# Patient Record
Sex: FEMALE | Race: WHITE | NOT HISPANIC OR LATINO | URBAN - METROPOLITAN AREA
[De-identification: names, ages, dates, MRNs, and addresses within clinical notes are randomized per-mention and may not be internally consistent; named-entity substitution may affect disease eponyms.]

---

## 2018-05-23 ENCOUNTER — OUTPATIENT (OUTPATIENT)
Dept: OUTPATIENT SERVICES | Facility: HOSPITAL | Age: 48
LOS: 1 days | End: 2018-05-23
Payer: MEDICAID

## 2018-05-23 VITALS
SYSTOLIC BLOOD PRESSURE: 141 MMHG | WEIGHT: 169.98 LBS | HEART RATE: 57 BPM | DIASTOLIC BLOOD PRESSURE: 86 MMHG | HEIGHT: 61 IN | OXYGEN SATURATION: 98 % | RESPIRATION RATE: 18 BRPM | TEMPERATURE: 98 F

## 2018-05-23 DIAGNOSIS — Z98.890 OTHER SPECIFIED POSTPROCEDURAL STATES: Chronic | ICD-10-CM

## 2018-05-23 DIAGNOSIS — R94.39 ABNORMAL RESULT OF OTHER CARDIOVASCULAR FUNCTION STUDY: ICD-10-CM

## 2018-05-23 DIAGNOSIS — Z90.49 ACQUIRED ABSENCE OF OTHER SPECIFIED PARTS OF DIGESTIVE TRACT: Chronic | ICD-10-CM

## 2018-05-23 LAB
ALBUMIN SERPL ELPH-MCNC: 4.1 G/DL — SIGNIFICANT CHANGE UP (ref 3.3–5)
ALP SERPL-CCNC: 47 U/L — SIGNIFICANT CHANGE UP (ref 40–120)
ALT FLD-CCNC: 15 U/L — SIGNIFICANT CHANGE UP (ref 10–45)
ANION GAP SERPL CALC-SCNC: 10 MMOL/L — SIGNIFICANT CHANGE UP (ref 5–17)
AST SERPL-CCNC: 18 U/L — SIGNIFICANT CHANGE UP (ref 10–40)
BILIRUB SERPL-MCNC: 0.4 MG/DL — SIGNIFICANT CHANGE UP (ref 0.2–1.2)
BUN SERPL-MCNC: 14 MG/DL — SIGNIFICANT CHANGE UP (ref 7–23)
CALCIUM SERPL-MCNC: 9.2 MG/DL — SIGNIFICANT CHANGE UP (ref 8.4–10.5)
CHLORIDE SERPL-SCNC: 102 MMOL/L — SIGNIFICANT CHANGE UP (ref 96–108)
CO2 SERPL-SCNC: 29 MMOL/L — SIGNIFICANT CHANGE UP (ref 22–31)
CREAT SERPL-MCNC: 0.6 MG/DL — SIGNIFICANT CHANGE UP (ref 0.5–1.3)
GLUCOSE SERPL-MCNC: 84 MG/DL — SIGNIFICANT CHANGE UP (ref 70–99)
HCG SERPL QL: NEGATIVE — SIGNIFICANT CHANGE UP
HCT VFR BLD CALC: 38.7 % — SIGNIFICANT CHANGE UP (ref 34.5–45)
HGB BLD-MCNC: 13.1 G/DL — SIGNIFICANT CHANGE UP (ref 11.5–15.5)
MCHC RBC-ENTMCNC: 30.9 PG — SIGNIFICANT CHANGE UP (ref 27–34)
MCHC RBC-ENTMCNC: 33.7 GM/DL — SIGNIFICANT CHANGE UP (ref 32–36)
MCV RBC AUTO: 91.7 FL — SIGNIFICANT CHANGE UP (ref 80–100)
PLATELET # BLD AUTO: 238 K/UL — SIGNIFICANT CHANGE UP (ref 150–400)
POTASSIUM SERPL-MCNC: 3.3 MMOL/L — LOW (ref 3.5–5.3)
POTASSIUM SERPL-SCNC: 3.3 MMOL/L — LOW (ref 3.5–5.3)
PROT SERPL-MCNC: 6.9 G/DL — SIGNIFICANT CHANGE UP (ref 6–8.3)
RBC # BLD: 4.22 M/UL — SIGNIFICANT CHANGE UP (ref 3.8–5.2)
RBC # FLD: 11.3 % — SIGNIFICANT CHANGE UP (ref 10.3–14.5)
SODIUM SERPL-SCNC: 141 MMOL/L — SIGNIFICANT CHANGE UP (ref 135–145)
WBC # BLD: 5.1 K/UL — SIGNIFICANT CHANGE UP (ref 3.8–10.5)
WBC # FLD AUTO: 5.1 K/UL — SIGNIFICANT CHANGE UP (ref 3.8–10.5)

## 2018-05-23 PROCEDURE — C1769: CPT

## 2018-05-23 PROCEDURE — 93005 ELECTROCARDIOGRAM TRACING: CPT

## 2018-05-23 PROCEDURE — 93458 L HRT ARTERY/VENTRICLE ANGIO: CPT

## 2018-05-23 PROCEDURE — C1887: CPT

## 2018-05-23 PROCEDURE — 80053 COMPREHEN METABOLIC PANEL: CPT

## 2018-05-23 PROCEDURE — 93458 L HRT ARTERY/VENTRICLE ANGIO: CPT | Mod: 26

## 2018-05-23 PROCEDURE — 85027 COMPLETE CBC AUTOMATED: CPT

## 2018-05-23 PROCEDURE — 99152 MOD SED SAME PHYS/QHP 5/>YRS: CPT

## 2018-05-23 PROCEDURE — 93010 ELECTROCARDIOGRAM REPORT: CPT

## 2018-05-23 PROCEDURE — C1894: CPT

## 2018-05-23 PROCEDURE — 84703 CHORIONIC GONADOTROPIN ASSAY: CPT

## 2018-05-23 RX ORDER — ATENOLOL 25 MG/1
0 TABLET ORAL
Qty: 0 | Refills: 0 | COMMUNITY

## 2018-05-23 RX ORDER — ATENOLOL AND CHLORTHALIDONE 50; 25 MG/1; MG/1
1 TABLET ORAL
Qty: 0 | Refills: 0 | COMMUNITY

## 2018-05-23 RX ORDER — ASPIRIN/CALCIUM CARB/MAGNESIUM 324 MG
1 TABLET ORAL
Qty: 0 | Refills: 0 | COMMUNITY

## 2018-05-23 RX ORDER — AMLODIPINE BESYLATE 2.5 MG/1
1 TABLET ORAL
Qty: 0 | Refills: 0 | COMMUNITY

## 2018-05-23 RX ORDER — POTASSIUM CHLORIDE 20 MEQ
25 PACKET (EA) ORAL
Qty: 0 | Refills: 0 | Status: COMPLETED | OUTPATIENT
Start: 2018-05-23 | End: 2018-05-23

## 2018-05-23 RX ADMIN — Medication 25 MILLIEQUIVALENT(S): at 12:40

## 2018-05-23 RX ADMIN — Medication 25 MILLIEQUIVALENT(S): at 12:39

## 2018-05-23 NOTE — H&P CARDIOLOGY - FAMILY HISTORY
Father  Still living? Unknown  HLD (hyperlipidemia), Age at diagnosis: Age Unknown  Diabetes mellitus, Age at diagnosis: Age Unknown     Mother  Still living? Unknown  HLD (hyperlipidemia), Age at diagnosis: Age Unknown  Diabetes mellitus, Age at diagnosis: Age Unknown

## 2018-05-23 NOTE — H&P CARDIOLOGY - HISTORY OF PRESENT ILLNESS
This is a 46 yo Khmer female LMP 5/10/18 PMH obesity s/p gastric sleeve with 100 pound weight loss, HTN, HLD, right LE vein stripping, cardiac cath approx 5 years ago pt. told it was normal, who presented to cardiology Dr. Villagomez with complaints of progressing SOB, sharp, needle like chest pain lasting a few seconds to minutes relieved with rest. Pt. denies dizziness, diaphoresis, palpitations, nausea, vomiting, peripheral edema, recent weight gain, or syncope. NST 4/20/18 small apical partially reversible defect, EF 70%.  Echocardiogram 11/2017 EF 55%; mild AR, mild MR, mild TR.  Pt. presents today asymptomatic for further evaluation and cardiac cath.     Antianginal regimen:  Amlodipine, Atenolol

## 2018-09-12 ENCOUNTER — EMERGENCY (EMERGENCY)
Facility: HOSPITAL | Age: 48
LOS: 1 days | Discharge: HOME | End: 2018-09-12
Attending: EMERGENCY MEDICINE | Admitting: EMERGENCY MEDICINE
Payer: MEDICAID

## 2018-09-12 VITALS
TEMPERATURE: 98 F | HEART RATE: 64 BPM | DIASTOLIC BLOOD PRESSURE: 77 MMHG | OXYGEN SATURATION: 100 % | RESPIRATION RATE: 18 BRPM | SYSTOLIC BLOOD PRESSURE: 124 MMHG

## 2018-09-12 VITALS
OXYGEN SATURATION: 98 % | HEART RATE: 59 BPM | DIASTOLIC BLOOD PRESSURE: 94 MMHG | RESPIRATION RATE: 18 BRPM | TEMPERATURE: 98 F | SYSTOLIC BLOOD PRESSURE: 126 MMHG

## 2018-09-12 DIAGNOSIS — Z98.890 OTHER SPECIFIED POSTPROCEDURAL STATES: Chronic | ICD-10-CM

## 2018-09-12 DIAGNOSIS — Z90.49 ACQUIRED ABSENCE OF OTHER SPECIFIED PARTS OF DIGESTIVE TRACT: Chronic | ICD-10-CM

## 2018-09-12 PROBLEM — E78.5 HYPERLIPIDEMIA, UNSPECIFIED: Chronic | Status: ACTIVE | Noted: 2018-05-23

## 2018-09-12 LAB
ALBUMIN SERPL ELPH-MCNC: 4.1 G/DL — SIGNIFICANT CHANGE UP (ref 3.3–5)
ALP SERPL-CCNC: 45 U/L — SIGNIFICANT CHANGE UP (ref 40–120)
ALT FLD-CCNC: 13 U/L — SIGNIFICANT CHANGE UP (ref 4–33)
APPEARANCE UR: CLEAR — SIGNIFICANT CHANGE UP
APTT BLD: 29.4 SEC — SIGNIFICANT CHANGE UP (ref 27.5–37.4)
AST SERPL-CCNC: 16 U/L — SIGNIFICANT CHANGE UP (ref 4–32)
BASOPHILS # BLD AUTO: 0.02 K/UL — SIGNIFICANT CHANGE UP (ref 0–0.2)
BASOPHILS NFR BLD AUTO: 0.4 % — SIGNIFICANT CHANGE UP (ref 0–2)
BILIRUB SERPL-MCNC: 0.4 MG/DL — SIGNIFICANT CHANGE UP (ref 0.2–1.2)
BILIRUB UR-MCNC: NEGATIVE — SIGNIFICANT CHANGE UP
BLD GP AB SCN SERPL QL: NEGATIVE — SIGNIFICANT CHANGE UP
BLOOD UR QL VISUAL: NEGATIVE — SIGNIFICANT CHANGE UP
BUN SERPL-MCNC: 14 MG/DL — SIGNIFICANT CHANGE UP (ref 7–23)
CALCIUM SERPL-MCNC: 8.9 MG/DL — SIGNIFICANT CHANGE UP (ref 8.4–10.5)
CHLORIDE SERPL-SCNC: 103 MMOL/L — SIGNIFICANT CHANGE UP (ref 98–107)
CO2 SERPL-SCNC: 21 MMOL/L — LOW (ref 22–31)
COLOR SPEC: COLORLESS — SIGNIFICANT CHANGE UP
CREAT SERPL-MCNC: 0.65 MG/DL — SIGNIFICANT CHANGE UP (ref 0.5–1.3)
EOSINOPHIL # BLD AUTO: 0.07 K/UL — SIGNIFICANT CHANGE UP (ref 0–0.5)
EOSINOPHIL NFR BLD AUTO: 1.4 % — SIGNIFICANT CHANGE UP (ref 0–6)
GLUCOSE SERPL-MCNC: 100 MG/DL — HIGH (ref 70–99)
GLUCOSE UR-MCNC: NEGATIVE — SIGNIFICANT CHANGE UP
HCG SERPL-ACNC: < 5 MIU/ML — SIGNIFICANT CHANGE UP
HCT VFR BLD CALC: 39.1 % — SIGNIFICANT CHANGE UP (ref 34.5–45)
HGB BLD-MCNC: 13.1 G/DL — SIGNIFICANT CHANGE UP (ref 11.5–15.5)
IMM GRANULOCYTES # BLD AUTO: 0.01 # — SIGNIFICANT CHANGE UP
IMM GRANULOCYTES NFR BLD AUTO: 0.2 % — SIGNIFICANT CHANGE UP (ref 0–1.5)
INR BLD: 0.99 — SIGNIFICANT CHANGE UP (ref 0.88–1.17)
KETONES UR-MCNC: NEGATIVE — SIGNIFICANT CHANGE UP
LEUKOCYTE ESTERASE UR-ACNC: NEGATIVE — SIGNIFICANT CHANGE UP
LIDOCAIN IGE QN: 26.6 U/L — SIGNIFICANT CHANGE UP (ref 7–60)
LYMPHOCYTES # BLD AUTO: 1.87 K/UL — SIGNIFICANT CHANGE UP (ref 1–3.3)
LYMPHOCYTES # BLD AUTO: 38.2 % — SIGNIFICANT CHANGE UP (ref 13–44)
MCHC RBC-ENTMCNC: 29.8 PG — SIGNIFICANT CHANGE UP (ref 27–34)
MCHC RBC-ENTMCNC: 33.5 % — SIGNIFICANT CHANGE UP (ref 32–36)
MCV RBC AUTO: 89.1 FL — SIGNIFICANT CHANGE UP (ref 80–100)
MONOCYTES # BLD AUTO: 0.4 K/UL — SIGNIFICANT CHANGE UP (ref 0–0.9)
MONOCYTES NFR BLD AUTO: 8.2 % — SIGNIFICANT CHANGE UP (ref 2–14)
NEUTROPHILS # BLD AUTO: 2.52 K/UL — SIGNIFICANT CHANGE UP (ref 1.8–7.4)
NEUTROPHILS NFR BLD AUTO: 51.6 % — SIGNIFICANT CHANGE UP (ref 43–77)
NITRITE UR-MCNC: NEGATIVE — SIGNIFICANT CHANGE UP
NRBC # FLD: 0 — SIGNIFICANT CHANGE UP
PH UR: 8 — SIGNIFICANT CHANGE UP (ref 5–8)
PLATELET # BLD AUTO: 243 K/UL — SIGNIFICANT CHANGE UP (ref 150–400)
PMV BLD: 10.6 FL — SIGNIFICANT CHANGE UP (ref 7–13)
POTASSIUM SERPL-MCNC: 3.5 MMOL/L — SIGNIFICANT CHANGE UP (ref 3.5–5.3)
POTASSIUM SERPL-SCNC: 3.5 MMOL/L — SIGNIFICANT CHANGE UP (ref 3.5–5.3)
PROT SERPL-MCNC: 7.3 G/DL — SIGNIFICANT CHANGE UP (ref 6–8.3)
PROT UR-MCNC: NEGATIVE — SIGNIFICANT CHANGE UP
PROTHROM AB SERPL-ACNC: 11.4 SEC — SIGNIFICANT CHANGE UP (ref 9.8–13.1)
RBC # BLD: 4.39 M/UL — SIGNIFICANT CHANGE UP (ref 3.8–5.2)
RBC # FLD: 12.4 % — SIGNIFICANT CHANGE UP (ref 10.3–14.5)
RH IG SCN BLD-IMP: POSITIVE — SIGNIFICANT CHANGE UP
SODIUM SERPL-SCNC: 139 MMOL/L — SIGNIFICANT CHANGE UP (ref 135–145)
SP GR SPEC: 1.01 — SIGNIFICANT CHANGE UP (ref 1–1.04)
UROBILINOGEN FLD QL: NORMAL — SIGNIFICANT CHANGE UP
WBC # BLD: 4.89 K/UL — SIGNIFICANT CHANGE UP (ref 3.8–10.5)
WBC # FLD AUTO: 4.89 K/UL — SIGNIFICANT CHANGE UP (ref 3.8–10.5)

## 2018-09-12 PROCEDURE — 74177 CT ABD & PELVIS W/CONTRAST: CPT | Mod: 26

## 2018-09-12 PROCEDURE — 99285 EMERGENCY DEPT VISIT HI MDM: CPT

## 2018-09-12 PROCEDURE — 71046 X-RAY EXAM CHEST 2 VIEWS: CPT | Mod: 26

## 2018-09-12 RX ORDER — SODIUM CHLORIDE 9 MG/ML
500 INJECTION INTRAMUSCULAR; INTRAVENOUS; SUBCUTANEOUS ONCE
Qty: 0 | Refills: 0 | Status: COMPLETED | OUTPATIENT
Start: 2018-09-12 | End: 2018-09-12

## 2018-09-12 RX ORDER — OXYCODONE AND ACETAMINOPHEN 5; 325 MG/1; MG/1
1 TABLET ORAL ONCE
Qty: 0 | Refills: 0 | Status: DISCONTINUED | OUTPATIENT
Start: 2018-09-12 | End: 2018-09-12

## 2018-09-12 RX ORDER — KETOROLAC TROMETHAMINE 30 MG/ML
30 SYRINGE (ML) INJECTION ONCE
Qty: 0 | Refills: 0 | Status: DISCONTINUED | OUTPATIENT
Start: 2018-09-12 | End: 2018-09-12

## 2018-09-12 RX ORDER — SODIUM CHLORIDE 9 MG/ML
1000 INJECTION INTRAMUSCULAR; INTRAVENOUS; SUBCUTANEOUS ONCE
Qty: 0 | Refills: 0 | Status: COMPLETED | OUTPATIENT
Start: 2018-09-12 | End: 2018-09-12

## 2018-09-12 RX ORDER — MORPHINE SULFATE 50 MG/1
4 CAPSULE, EXTENDED RELEASE ORAL ONCE
Qty: 0 | Refills: 0 | Status: DISCONTINUED | OUTPATIENT
Start: 2018-09-12 | End: 2018-09-12

## 2018-09-12 RX ADMIN — SODIUM CHLORIDE 1000 MILLILITER(S): 9 INJECTION INTRAMUSCULAR; INTRAVENOUS; SUBCUTANEOUS at 12:48

## 2018-09-12 RX ADMIN — OXYCODONE AND ACETAMINOPHEN 1 TABLET(S): 5; 325 TABLET ORAL at 20:02

## 2018-09-12 RX ADMIN — Medication 30 MILLIGRAM(S): at 14:30

## 2018-09-12 RX ADMIN — SODIUM CHLORIDE 500 MILLILITER(S): 9 INJECTION INTRAMUSCULAR; INTRAVENOUS; SUBCUTANEOUS at 18:39

## 2018-09-12 RX ADMIN — Medication 30 MILLIGRAM(S): at 13:40

## 2018-09-12 RX ADMIN — SODIUM CHLORIDE 1000 MILLILITER(S): 9 INJECTION INTRAMUSCULAR; INTRAVENOUS; SUBCUTANEOUS at 14:30

## 2018-09-12 NOTE — ED PROVIDER NOTE - OBJECTIVE STATEMENT
HPI: Patient is a 48 y.o female with PMHx of gastric sleeve (4 years ago), HTN, Hx appendectomy and  who presents to ED sent by PCP Dr. Delgado for abdominal pain. As per patient states she began to have epigastric abdominal pain radiating to umbilicus since last night. Pain sharp in nature, rates it 7/10. Pain exacerbated with standing up, improves at rest. Patient admits to taking naproxen yesterday with little relief. Patient saw PCP today who advised patient come to ED for further eval for umbilical hernia found on outpatient exam. Admits also to urinary discomfort. Pt denies n/v/d, constipation, fevers, chills, hematuria, neck pain, back pain, weakness, numbness or tingling, chest pain, sob, hx smoking, Fhx of CAD. Admits she has had negative Cath's x 2. HPI: Patient is a 48 y.o female with PMHx of gastric sleeve (4 years ago), HTN, Hx appendectomy and  who presents to ED sent by PCP Dr. Delgado for abdominal pain. As per patient states she began to have epigastric abdominal pain radiating to umbilicus since last night. Pain sharp in nature, rates it 7/10. Pain exacerbated with standing up, improves at rest. Patient admits to taking naproxen yesterday with little relief. Patient saw PCP today who advised patient come to ED for further eval for umbilical hernia found on outpatient exam. Admits also to urinary discomfort. Pt denies n/v/d, constipation, fevers, chills, hematuria, neck pain, back pain, weakness, numbness or tingling, chest pain, sob, hx smoking, Fhx of CAD. Admits she has had negative Cath's x 2.    Gastric sleeve~performed 4 years ago at [facility], unknown physician

## 2018-09-12 NOTE — CONSULT NOTE ADULT - ASSESSMENT
48F s/p sleeve gastrectomy presenting with abdominal pain that is positional, no current pain  -Imaging reviewed with radiology. Although impression states the appearance of small bowel may be due to a wide mesenteric defect, patient has not had surgery that would cause a mesenteric defect. She also does not have any findings concerning for SBO or ischemia.  -PO challenge  -if tolerates, d/c home  -patient prefers to follow up with her own surgeon at [facility] and has his contact information. She was instructed to follow up with him tomorrow. She was also instructed that if her pain worsens, or she develops other symptoms such as nausea/emesis or fevers, she should return to the ED immediately. She verbalizes understanding and is comfortable with discharge and following up with her surgeon tomorrow.   -d/w Dr. Santosh Lauren, PGY 5  Surgery

## 2018-09-12 NOTE — ED PROVIDER NOTE - PHYSICAL EXAMINATION
Vital signs reviewed.   CONSTITUTIONAL: Well-appearing; well-nourished; in no apparent distress. Non-toxic appearing.   HEAD: Normocephalic, atraumatic.  EYES: PERRL, EOM intact, conjunctiva and sclera WNL.  ENT: normal nose; no rhinorrhea;  NECK/LYMPH: Supple; non-tender; No midline tenderness.   CARD: Normal S1, S2; no murmurs, rubs, or gallops noted.  RESP: Normal chest excursion with respiration; breath sounds clear and equal bilaterally; no wheezes, rhonchi, or rales.  ABD/GI: soft, non-distended; +diffuse abdominal tenderness noted, mostly epigastric and umbilical, small umbilical hernia appreciated; no palpable organomegaly, no pulsatile mass. NCVAT b/l.   EXT/MS: moves all extremities; distal pulses are normal, no pedal edema. No midline tenderness noted. No crepitus or obvious deformities noted.   SKIN: Normal for age and race; warm; dry; good turgor; no apparent lesions or exudate noted.  NEURO: Awake, alert, oriented x 3, no gross deficits  PSYCH: Normal mood; appropriate affect.

## 2018-09-12 NOTE — ED ADULT TRIAGE NOTE - CHIEF COMPLAINT QUOTE
pt presents sent by PMD for epigastric pain since yesterday, r/o umbilical hernia with incarceration. denies n/v/d/fevers or chills or any additional symptoms. pt appears very uncomfortable in triage, writhing in pain PMHX: htn

## 2018-09-12 NOTE — ED PROVIDER NOTE - MEDICAL DECISION MAKING DETAILS
Patient is a 48 y.o female c/o abdominal pain x 2 days. Sent in by PCP.   DDx- includes but not limited to; gastritis, pancreatitis r/o incarcerated umbilical hernia   Plan- labs, fluids, pain control, CT A/P w/ contrast, will continue ot monitor closely. Patient is a 48 y.o female c/o abdominal pain x 2 days. Sent in by PCP.   DDx- includes but not limited to; gastritis, pancreatitis r/o incarcerated umbilical hernia   Plan- labs, fluids, pain control, CT A/P w/ contrast, will continue ot monitor closely.  Seen by surgery for mesenteric defect. Pt stable for discharge and can follow up with her Biatric surgeron at [facility] as outpatient. No need for admission. Plan to discharge.

## 2018-09-12 NOTE — CONSULT NOTE ADULT - ATTENDING COMMENTS
Clinical documentation, data and note reviewed and edited where appropriate.  D/W note writer at time of consultation.

## 2018-09-12 NOTE — ED PROVIDER NOTE - PROGRESS NOTE DETAILS
Results of labs and imaging reviewed. Results of CT A/P concerning for internal hernia showing "angulation of small bowel and sigmoid colon w/ mesenteric defect". Pt had gastric sleeve performed by Dr. James at [facility] 4 years ago. Call was placed to office and message left with RN #[phone], pending callback from BEATRIZ Pelletier from office. Will place consult to inhouse surgery at this time. Will monitor closely. Surgery was consulted by myself and MD. Pending surgery evaluation. Family updated on plan. Call was re-placed to Surgeon Dr. James office with no reply~message was left on voicemail. Will continue to monitor closely. Call also placed to [facility] Dr. James surgical department at [phone] w/ message and callback number left. Patient was seen by Surgical resident, spoke to resident who states he is waiting for callback from bariatric fellow. States though he spoke to radiology regarding CT results and states based on report and patient being non-tender, well appearing with stable labs and vitals that patient may be d/c home and follow up with patient surgeon tomorrow. However pending official callback and recommendations from bariatric fellow. Will make patient and family aware. Pt was signed out to me awaiting Surgical Evaluation. Multiple attempts were made to reach Dr. James with messages left and no call back. Surgery has seen the pt and is consulting the Bariatric fellow. Pt and family aware. Dr. Barraza: Pt was signed out to me awaiting Surgical Evaluation. Multiple attempts were made to reach Dr. James with messages left and no call back. Surgery has seen the pt and is consulting the Bariatric fellow. Pt and family aware. Dr. Barraza: Pt was evaluated again by surgery after consultation and with pt's symptoms resolved and tolerating PO advised f/u as outpt with pt's Bariatric surgeon. Pt and family expressed understanding and were given copies of results.

## 2018-09-12 NOTE — CONSULT NOTE ADULT - SUBJECTIVE AND OBJECTIVE BOX
Bariatric Surgery Consult  Consulting attending: Dr. Frost    HPI: 48F with history of sleeve gastrectomy presenting with abdominal pain. Pain began this morning prior to patient eating breakfast, and has remained constant throughout the day. She denies nausea/emesis, or worsening pain with PO. She has tolerated two meals today, most recently about half an hour ago without worsening pain. She is passing flatus and having bowel movements. Pain is worse with movement, when she turns a certain way. No current pain as she is lying down.     PAST MEDICAL HISTORY:  HLD (hyperlipidemia)  Myocardial infarction  Hypertension  Obesity    PAST SURGICAL HISTORY:  H/O vein stripping  History of appendectomy   delivery delivered  sleeve gastrectomy 2014 at [facility]      MEDICATIONS: vitamins, anti-hypertensive medications      ALLERGIES:  No Known Allergies      VITALS & I/Os:  Vital Signs Last 24 Hrs  T(C): 36.9 (12 Sep 2018 19:21), Max: 37.1 (12 Sep 2018 16:14)  T(F): 98.5 (12 Sep 2018 19:21), Max: 98.8 (12 Sep 2018 16:14)  HR: 59 (12 Sep 2018 19:21) (59 - 65)  BP: 126/94 (12 Sep 2018 19:21) (109/72 - 126/94)  BP(mean): --  RR: 18 (12 Sep 2018 19:21) (18 - 19)  SpO2: 98% (12 Sep 2018 19:21) (98% - 100%)    PHYSICAL EXAM:  General: No acute distress  Respiratory: Nonlabored  Cardiovascular: Regular rate and rhythm.   Abdominal: Soft, nondistended, nontender. No rebound or guarding. No organomegaly, no palpable mass.  Extremities: Warm    LABS:                        13.1   4.89  )-----------( 243      ( 12 Sep 2018 11:45 )             39.1     09-12    139  |  103  |  14  ----------------------------<  100<H>  3.5   |  21<L>  |  0.65    Ca    8.9      12 Sep 2018 11:45    TPro  7.3  /  Alb  4.1  /  TBili  0.4  /  DBili  x   /  AST  16  /  ALT  13  /  AlkPhos  45  -12    Lactate:    PT/INR - ( 12 Sep 2018 11:45 )   PT: 11.4 SEC;   INR: 0.99          PTT - ( 12 Sep 2018 11:45 )  PTT:29.4 SEC          Urinalysis Basic - ( 12 Sep 2018 13:44 )    Color: COLORLESS / Appearance: CLEAR / S.011 / pH: 8.0  Gluc: NEGATIVE / Ketone: NEGATIVE  / Bili: NEGATIVE / Urobili: NORMAL   Blood: NEGATIVE / Protein: NEGATIVE / Nitrite: NEGATIVE   Leuk Esterase: NEGATIVE / RBC: x / WBC x   Sq Epi: x / Non Sq Epi: x / Bacteria: x        IMAGING:  CT Abdomen and Pelvis w/ Oral Cont and w/ IV Cont (18 @ 14:27) >  IMPRESSION:     Status post sleeve gastrectomy with angulation of small bowel loops and   sigmoid colon within the left hemiabdomen. Findings may be secondary to a   wide mesenteric defect. No evidence of small bowel obstruction or bowel   wall thickening.

## 2018-09-12 NOTE — ED PROVIDER NOTE - ATTENDING CONTRIBUTION TO CARE
HPI: Patient is a 48 y.o female with PMHx of gastric sleeve (4 years ago), HTN, Hx appendectomy and  who presents to ED sent by PCP Dr. Delgado for abdominal pain. As per patient states she began to have epigastric abdominal pain radiating to umbilicus since last night. Pain sharp in nature, rates it /10. Pos flatus  pe ncat  s1s2 rrr abd tender epigastric no cce      imp abd pain--bariatric surgery    plan ct--bariatric fu with results, signed outto dr staples to fu ct results and lab results and consult surgery as necessary HPI: Patient is a 48 y.o female with PMHx of gastric sleeve (4 years ago), HTN, Hx appendectomy and  who presents to ED sent by PCP Dr. Delgado for abdominal pain. As per patient states she began to have epigastric abdominal pain radiating to umbilicus since last night. Pain sharp in nature, rates it /10. Pos flatus  pe ncat  s1s2 rrr abd tender epigastric no cce      imp abd pain--bariatric surgery    plan ct--bariatric fu with results, signed out to dr su to fu ct results and lab results and consult surgery as necessary

## 2018-09-12 NOTE — ED ADULT NURSE NOTE - NSIMPLEMENTINTERV_GEN_ALL_ED
Implemented All Universal Safety Interventions:  [place] to call system. Call bell, personal items and telephone within reach. Instruct patient to call for assistance. Room bathroom lighting operational. Non-slip footwear when patient is off stretcher. Physically safe environment: no spills, clutter or unnecessary equipment. Stretcher in lowest position, wheels locked, appropriate side rails in place.

## 2018-09-12 NOTE — ED ADULT NURSE NOTE - OBJECTIVE STATEMENT
Received pt. in room 7 alert and oriented x 4, vss. Presenting to the ER complaining of right lower abdominal pain radiating to under right breast. Pt. have a history of HTN and stated " my abdomen started hurting yesterday and the pain moved to under my right breast now". Patient have no complaints of nausea or vomiting, no complaints of chest pain, labs sent will continue to monitor.

## 2021-04-08 PROBLEM — Z00.00 ENCOUNTER FOR PREVENTIVE HEALTH EXAMINATION: Status: ACTIVE | Noted: 2021-04-08

## 2021-04-12 ENCOUNTER — APPOINTMENT (OUTPATIENT)
Dept: PULMONOLOGY | Facility: CLINIC | Age: 51
End: 2021-04-12
Payer: MEDICAID

## 2021-04-12 VITALS
DIASTOLIC BLOOD PRESSURE: 79 MMHG | BODY MASS INDEX: 41.03 KG/M2 | HEART RATE: 63 BPM | HEIGHT: 60 IN | WEIGHT: 209 LBS | OXYGEN SATURATION: 97 % | RESPIRATION RATE: 16 BRPM | SYSTOLIC BLOOD PRESSURE: 123 MMHG | TEMPERATURE: 98 F

## 2021-04-12 DIAGNOSIS — Z86.79 PERSONAL HISTORY OF OTHER DISEASES OF THE CIRCULATORY SYSTEM: ICD-10-CM

## 2021-04-12 DIAGNOSIS — Z83.3 FAMILY HISTORY OF DIABETES MELLITUS: ICD-10-CM

## 2021-04-12 DIAGNOSIS — Z82.49 FAMILY HISTORY OF ISCHEMIC HEART DISEASE AND OTHER DISEASES OF THE CIRCULATORY SYSTEM: ICD-10-CM

## 2021-04-12 DIAGNOSIS — Z78.9 OTHER SPECIFIED HEALTH STATUS: ICD-10-CM

## 2021-04-12 DIAGNOSIS — R53.83 OTHER FATIGUE: ICD-10-CM

## 2021-04-12 DIAGNOSIS — Z01.811 ENCOUNTER FOR PREPROCEDURAL RESPIRATORY EXAMINATION: ICD-10-CM

## 2021-04-12 PROCEDURE — 99072 ADDL SUPL MATRL&STAF TM PHE: CPT

## 2021-04-12 PROCEDURE — 94729 DIFFUSING CAPACITY: CPT

## 2021-04-12 PROCEDURE — 94060 EVALUATION OF WHEEZING: CPT

## 2021-04-12 PROCEDURE — 94727 GAS DIL/WSHOT DETER LNG VOL: CPT

## 2021-04-12 PROCEDURE — 99203 OFFICE O/P NEW LOW 30 MIN: CPT | Mod: 25

## 2021-04-12 RX ORDER — OFLOXACIN OTIC 3 MG/ML
0.3 SOLUTION AURICULAR (OTIC)
Qty: 10 | Refills: 0 | Status: ACTIVE | COMMUNITY
Start: 2021-04-07

## 2021-04-12 RX ORDER — ACETAMINOPHEN EXTRA STRENGTH 500 MG/1
500 TABLET ORAL
Qty: 40 | Refills: 0 | Status: COMPLETED | COMMUNITY
Start: 2020-12-28

## 2021-04-12 RX ORDER — ADHESIVE TAPE 3"X 2.3 YD
50 MCG TAPE, NON-MEDICATED TOPICAL
Qty: 30 | Refills: 0 | Status: ACTIVE | COMMUNITY
Start: 2021-02-23

## 2021-04-12 RX ORDER — NAPROXEN 500 MG/1
500 TABLET ORAL
Qty: 60 | Refills: 0 | Status: ACTIVE | COMMUNITY
Start: 2021-02-23

## 2021-04-12 RX ORDER — BISACODYL 5 MG/1
5 TABLET ORAL
Qty: 20 | Refills: 0 | Status: ACTIVE | COMMUNITY
Start: 2021-04-07

## 2021-04-12 RX ORDER — ENALAPRIL MALEATE 10 MG/1
10 TABLET ORAL
Qty: 30 | Refills: 0 | Status: ACTIVE | COMMUNITY
Start: 2021-03-24

## 2021-04-12 RX ORDER — POTASSIUM CHLORIDE 1500 MG/1
20 TABLET, EXTENDED RELEASE ORAL
Qty: 30 | Refills: 0 | Status: ACTIVE | COMMUNITY
Start: 2021-01-25

## 2021-04-12 RX ORDER — ATENOLOL AND CHLORTHALIDONE 50; 25 MG/1; MG/1
50-25 TABLET ORAL
Qty: 30 | Refills: 0 | Status: ACTIVE | COMMUNITY
Start: 2021-03-24

## 2021-04-12 RX ORDER — ASPIRIN ENTERIC COATED TABLETS 81 MG 81 MG/1
81 TABLET, DELAYED RELEASE ORAL
Qty: 30 | Refills: 0 | Status: ACTIVE | COMMUNITY
Start: 2021-03-29

## 2021-05-10 NOTE — DISCUSSION/SUMMARY
[FreeTextEntry1] : 49 yo female with stable preop pulmonary exam. Home sleep study will be performed. At present there is no pulmonary contraindication for planned anesthesia and surgery. She is to follow up with her PMD as before.

## 2021-05-10 NOTE — HISTORY OF PRESENT ILLNESS
[Never] : never [Awakes Unrefreshed] : awakes unrefreshed [Fatigue] : fatigue [Hypersomnolence] : hypersomnolence [TextBox_4] : 49 yo female presents for preop pulmonary evaluation prior to bariatric surgery. The patient denies prior pulmonary problems. She complains of PRN fatigue with daytime somnolence. She had lost 100 pounds after initial bariatric surgery but has regained 40 pounds again.

## 2021-05-10 NOTE — CONSULT LETTER
[Dear  ___] : Dear  [unfilled], [Courtesy Letter:] : I had the pleasure of seeing your patient, [unfilled], in my office today. [Please see my note below.] : Please see my note below. [Consult Closing:] : Thank you very much for allowing me to participate in the care of this patient.  If you have any questions, please do not hesitate to contact me. [Sincerely,] : Sincerely, [DrUsha  ___] : Dr. SILVA

## 2021-05-10 NOTE — ADDENDUM
[FreeTextEntry1] : May 10, 2021 Spoke with patient re: PSG results; Auto PAP 4-14 cm ordered\par At present there is no pulmonary contraindication for planned anesthesia and surgery

## 2023-05-31 ENCOUNTER — EMERGENCY (EMERGENCY)
Facility: HOSPITAL | Age: 53
LOS: 1 days | Discharge: HOME | End: 2023-05-31
Attending: EMERGENCY MEDICINE
Payer: MEDICAID

## 2023-05-31 VITALS
SYSTOLIC BLOOD PRESSURE: 125 MMHG | OXYGEN SATURATION: 97 % | DIASTOLIC BLOOD PRESSURE: 85 MMHG | WEIGHT: 125 LBS | RESPIRATION RATE: 20 BRPM | HEIGHT: 70 IN | HEART RATE: 82 BPM | TEMPERATURE: 98 F

## 2023-05-31 DIAGNOSIS — Z98.890 OTHER SPECIFIED POSTPROCEDURAL STATES: Chronic | ICD-10-CM

## 2023-05-31 DIAGNOSIS — Z90.49 ACQUIRED ABSENCE OF OTHER SPECIFIED PARTS OF DIGESTIVE TRACT: Chronic | ICD-10-CM

## 2023-05-31 LAB
ALBUMIN SERPL ELPH-MCNC: 3.5 G/DL — SIGNIFICANT CHANGE UP (ref 3.3–5)
ALP SERPL-CCNC: 77 U/L — SIGNIFICANT CHANGE UP (ref 40–120)
ALT FLD-CCNC: 20 U/L — SIGNIFICANT CHANGE UP (ref 10–45)
ANION GAP SERPL CALC-SCNC: 12 MMOL/L — SIGNIFICANT CHANGE UP (ref 5–17)
AST SERPL-CCNC: 23 U/L — SIGNIFICANT CHANGE UP (ref 10–40)
BILIRUB SERPL-MCNC: 0.2 MG/DL — SIGNIFICANT CHANGE UP (ref 0.2–1.2)
BUN SERPL-MCNC: 19 MG/DL — SIGNIFICANT CHANGE UP (ref 7–23)
CALCIUM SERPL-MCNC: 8.1 MG/DL — LOW (ref 8.4–10.5)
CHLORIDE SERPL-SCNC: 105 MMOL/L — SIGNIFICANT CHANGE UP (ref 96–108)
CO2 SERPL-SCNC: 20 MMOL/L — LOW (ref 22–31)
CREAT SERPL-MCNC: 0.52 MG/DL — SIGNIFICANT CHANGE UP (ref 0.5–1.3)
EGFR: 112 ML/MIN/1.73M2 — SIGNIFICANT CHANGE UP
GLUCOSE SERPL-MCNC: 121 MG/DL — HIGH (ref 70–99)
HCG SERPL-ACNC: <2 MIU/ML — SIGNIFICANT CHANGE UP
HCT VFR BLD CALC: 27.3 % — LOW (ref 34.5–45)
HGB BLD-MCNC: 8.2 G/DL — LOW (ref 11.5–15.5)
MCHC RBC-ENTMCNC: 21 PG — LOW (ref 27–34)
MCHC RBC-ENTMCNC: 30 GM/DL — LOW (ref 32–36)
MCV RBC AUTO: 70 FL — LOW (ref 80–100)
PLATELET # BLD AUTO: 393 K/UL — SIGNIFICANT CHANGE UP (ref 150–400)
POTASSIUM SERPL-MCNC: 3.2 MMOL/L — LOW (ref 3.5–5.3)
POTASSIUM SERPL-SCNC: 3.2 MMOL/L — LOW (ref 3.5–5.3)
PROT SERPL-MCNC: 6.1 G/DL — SIGNIFICANT CHANGE UP (ref 6–8.3)
RBC # BLD: 3.9 M/UL — SIGNIFICANT CHANGE UP (ref 3.8–5.2)
RBC # FLD: 19.8 % — HIGH (ref 10.3–14.5)
SODIUM SERPL-SCNC: 137 MMOL/L — SIGNIFICANT CHANGE UP (ref 135–145)
WBC # BLD: 10.02 K/UL — SIGNIFICANT CHANGE UP (ref 3.8–10.5)
WBC # FLD AUTO: 10.02 K/UL — SIGNIFICANT CHANGE UP (ref 3.8–10.5)

## 2023-05-31 PROCEDURE — 71045 X-RAY EXAM CHEST 1 VIEW: CPT | Mod: 26

## 2023-05-31 PROCEDURE — 99284 EMERGENCY DEPT VISIT MOD MDM: CPT

## 2023-05-31 RX ORDER — SODIUM CHLORIDE 9 MG/ML
1000 INJECTION INTRAMUSCULAR; INTRAVENOUS; SUBCUTANEOUS ONCE
Refills: 0 | Status: COMPLETED | OUTPATIENT
Start: 2023-05-31 | End: 2023-05-31

## 2023-05-31 RX ADMIN — SODIUM CHLORIDE 1000 MILLILITER(S): 9 INJECTION INTRAMUSCULAR; INTRAVENOUS; SUBCUTANEOUS at 23:29

## 2023-05-31 NOTE — ED PROVIDER NOTE - PROGRESS NOTE DETAILS
Dr. Grigsby Note: pt without any sxs.  Reviewed labs with pt and daughter at bedside.  Pt with episode of vomiting, then one hour later while sitting on couch had near syncope, resolved, then episode of diarrhea.  No sxs now.  Offered overnight stay for observation, echo, pt declined and will f/u outpatient for possible echo and return if worsening.  Suspect pt with vasovagal near syncope due to GI stimulus with N/V/D.  Kelle PO.  Stable for outpt.

## 2023-05-31 NOTE — ED PROVIDER NOTE - CLINICAL SUMMARY MEDICAL DECISION MAKING FREE TEXT BOX
2 episodes of syncope with associated chest discomfort.  We will do cardiac enzymes x2 and IV fluids and reexamine the patient to determine if the cause might be related to orthostatic/vasovagal or for concerned with another cause.  Asked patient has coronary history and I have the sense that we should monitor the patient more carefully and possibly keep her for an echo and further testings.

## 2023-05-31 NOTE — ED ADULT NURSE NOTE - OBJECTIVE STATEMENT
Pt 52 year old female, A/O x4. Pt came in due to syncope. PMH- HTN. As per ems, x 1 hour, pt had nausea, near syncope and chest pain. Upon assessment, pt ambulating independent and complaining of chest pain and dizziness. Skin- warm, dry, intact. Abd. soft, nontender, nondistended. Denies sob, fever, chills, vomiting, dysuria, ha, numbness/ tingling. Pt 52 year old female, A/O x4. Pt came in due to syncope. PMH- HTN. As per pt, 2 hours prior to arrival she was grilling outside when she suddenly fel faint. Pt states she "felt like I was dying". Pt daughter states they sat her down and pt complaining of n/v/d. Upon assessment, pt appears lethargic. Skin- warm, dry, intact. Abd. soft, nontender, nondistended. Denies sob, fever, chills, dysuria, ha, numbness/ tingling.

## 2023-05-31 NOTE — ED PROVIDER NOTE - OBJECTIVE STATEMENT
This is a 52-year-old with a history of "heart attack" who felt epigastric discomfort and vomited and subsequently became near syncope.  Was lowered to the ground did not fall.  She did not fully lose consciousness.  There is no associated chest pain at the time.  She sat on the couch for 20 or 30 minutes and then tried to get up and "turned green" and again became pale and almost passed out again.  There is no abdominal pain at that time or this time.  Notably now she has mild sternal chest pressure associated with that episode which is since resolved.  Is nonradiating.  There is no shortness of breath.  There is no diaphoresis.  There is no fevers or chills.  There is no URI symptoms.

## 2023-05-31 NOTE — ED PROVIDER NOTE - NSFOLLOWUPINSTRUCTIONS_ED_ALL_ED_FT
1. No signs of emergency medical condition on today's workup.  Presumptive diagnosis made, but further evaluation may be required by your primary care doctor or specialist for a definitive diagnosis.  Therefore, follow up as directed and if symptoms change/worsen or any emergency conditions, please return to the ER.   2. Presumptive diagnosis is episode of vomiting and diarrhea stimulating a possible vasovagal near syncope.  Keep up on fluids, follow up PCP and Cardiologist, consider echocardiogram.  You were offered observation and declined, but if you feel worse again or any concerns, return to ER immediately.

## 2023-05-31 NOTE — ED PROVIDER NOTE - PHYSICAL EXAMINATION
Patient is well-appearing and in no acute distress.  Dry mucous membranes and lips.  Clear lungs.  Regular rate and rhythm.  No abdominal tenderness.  Neuro exam is normal.  Legs have no edema.  Skin is normal.

## 2023-05-31 NOTE — ED PROVIDER NOTE - PATIENT PORTAL LINK FT
You can access the FollowMyHealth Patient Portal offered by [facility] by registering at the following website: [email or web address]. By joining [employer]’s FollowMyHealth portal, you will also be able to view your health information using other applications (apps) compatible with our system.

## 2023-06-01 VITALS
RESPIRATION RATE: 18 BRPM | SYSTOLIC BLOOD PRESSURE: 128 MMHG | OXYGEN SATURATION: 98 % | DIASTOLIC BLOOD PRESSURE: 68 MMHG | TEMPERATURE: 98 F | HEART RATE: 78 BPM

## 2023-06-01 LAB
ANISOCYTOSIS BLD QL: SIGNIFICANT CHANGE UP
BASOPHILS # BLD AUTO: 0 K/UL — SIGNIFICANT CHANGE UP (ref 0–0.2)
BASOPHILS NFR BLD AUTO: 0 % — SIGNIFICANT CHANGE UP (ref 0–2)
ELLIPTOCYTES BLD QL SMEAR: SLIGHT — SIGNIFICANT CHANGE UP
EOSINOPHIL # BLD AUTO: 0.09 K/UL — SIGNIFICANT CHANGE UP (ref 0–0.5)
EOSINOPHIL NFR BLD AUTO: 0.9 % — SIGNIFICANT CHANGE UP (ref 0–6)
GIANT PLATELETS BLD QL SMEAR: PRESENT — SIGNIFICANT CHANGE UP
HYPOCHROMIA BLD QL: SLIGHT — SIGNIFICANT CHANGE UP
LYMPHOCYTES # BLD AUTO: 0.69 K/UL — LOW (ref 1–3.3)
LYMPHOCYTES # BLD AUTO: 6.9 % — LOW (ref 13–44)
MANUAL SMEAR VERIFICATION: SIGNIFICANT CHANGE UP
MICROCYTES BLD QL: SIGNIFICANT CHANGE UP
MONOCYTES # BLD AUTO: 0.26 K/UL — SIGNIFICANT CHANGE UP (ref 0–0.9)
MONOCYTES NFR BLD AUTO: 2.6 % — SIGNIFICANT CHANGE UP (ref 2–14)
NEUTROPHILS # BLD AUTO: 8.98 K/UL — HIGH (ref 1.8–7.4)
NEUTROPHILS NFR BLD AUTO: 89.6 % — HIGH (ref 43–77)
OVALOCYTES BLD QL SMEAR: SLIGHT — SIGNIFICANT CHANGE UP
PLAT MORPH BLD: NORMAL — SIGNIFICANT CHANGE UP
POLYCHROMASIA BLD QL SMEAR: SLIGHT — SIGNIFICANT CHANGE UP
RBC BLD AUTO: ABNORMAL
SCHISTOCYTES BLD QL AUTO: SLIGHT — SIGNIFICANT CHANGE UP
SMUDGE CELLS # BLD: PRESENT — SIGNIFICANT CHANGE UP
STOMATOCYTES BLD QL SMEAR: SLIGHT — SIGNIFICANT CHANGE UP
TARGETS BLD QL SMEAR: SLIGHT — SIGNIFICANT CHANGE UP
TROPONIN T, HIGH SENSITIVITY RESULT: <6 NG/L — SIGNIFICANT CHANGE UP (ref 0–51)

## 2023-06-01 PROCEDURE — 99285 EMERGENCY DEPT VISIT HI MDM: CPT | Mod: 25

## 2023-06-01 PROCEDURE — 84702 CHORIONIC GONADOTROPIN TEST: CPT

## 2023-06-01 PROCEDURE — 93005 ELECTROCARDIOGRAM TRACING: CPT

## 2023-06-01 PROCEDURE — 85025 COMPLETE CBC W/AUTO DIFF WBC: CPT

## 2023-06-01 PROCEDURE — 82962 GLUCOSE BLOOD TEST: CPT

## 2023-06-01 PROCEDURE — 80053 COMPREHEN METABOLIC PANEL: CPT

## 2023-06-01 PROCEDURE — 36415 COLL VENOUS BLD VENIPUNCTURE: CPT

## 2023-06-01 PROCEDURE — 84484 ASSAY OF TROPONIN QUANT: CPT

## 2023-06-01 PROCEDURE — 71045 X-RAY EXAM CHEST 1 VIEW: CPT

## 2023-06-01 RX ORDER — POTASSIUM CHLORIDE 20 MEQ
40 PACKET (EA) ORAL ONCE
Refills: 0 | Status: COMPLETED | OUTPATIENT
Start: 2023-06-01 | End: 2023-06-01

## 2023-06-01 RX ADMIN — Medication 40 MILLIEQUIVALENT(S): at 00:57

## 2023-06-01 NOTE — ED POST DISCHARGE NOTE - DETAILS
Spoke to pt spouse and informed of results. Advised she should follow up with PCP. He endorsed understanding of plan. - Sallie Valentin PA-C

## 2024-09-13 ENCOUNTER — APPOINTMENT (OUTPATIENT)
Dept: ORTHOPEDIC SURGERY | Facility: CLINIC | Age: 54
End: 2024-09-13
Payer: MEDICAID

## 2024-09-13 VITALS — HEIGHT: 61 IN | WEIGHT: 188 LBS | BODY MASS INDEX: 35.5 KG/M2

## 2024-09-13 DIAGNOSIS — E78.00 PURE HYPERCHOLESTEROLEMIA, UNSPECIFIED: ICD-10-CM

## 2024-09-13 DIAGNOSIS — M17.0 BILATERAL PRIMARY OSTEOARTHRITIS OF KNEE: ICD-10-CM

## 2024-09-13 DIAGNOSIS — Z78.9 OTHER SPECIFIED HEALTH STATUS: ICD-10-CM

## 2024-09-13 PROCEDURE — 73562 X-RAY EXAM OF KNEE 3: CPT | Mod: 50

## 2024-09-13 PROCEDURE — 99203 OFFICE O/P NEW LOW 30 MIN: CPT

## 2024-09-13 RX ORDER — LOSARTAN POTASSIUM 100 MG/1
TABLET, FILM COATED ORAL
Refills: 0 | Status: ACTIVE | COMMUNITY

## 2024-09-13 NOTE — HISTORY OF PRESENT ILLNESS
[Gradual] : gradual [9] : 9 [4] : 4 [Sharp] : sharp [Frequent] : frequent [Nothing helps with pain getting better] : Nothing helps with pain getting better [de-identified] : This is Ms. ANDREE STODDARD  a 54 year old female who comes in today complaining of bilateral knee pain for a couple of years with no injury.  She was seen at pain management and had injections and ablations done.  difficulty with stairs. pain walking. has seen previous providers and has done PT/ nsaids/ CSI/ HA inj/ nerve ablation all with minimal help. Injections give her around 1 days of relief. Pain is significantly limiting ALDs.  [] : no [de-identified] : Pain management

## 2024-09-13 NOTE — IMAGING
[Bilateral] : knee bilaterally [Moderate tricompartmental OA lateral narrowing] : Moderate tricompartmental OA lateral narrowing [advanced tricompartmental OA with medial compartment narrowing and varus alignment] : advanced tricompartmental OA with medial compartment narrowing and varus alignment [Moderate patellofemoral OA] : Moderate patellofemoral OA [de-identified] :   ----------------------------------------------------------------------------   Bilateral knee exam:   Skin: no significant pertinent finding Inspection:      (neg) Effusion      (neg) Malalignment      (neg) Swelling      (neg) Quad atrophy      (neg) J-sign ROM:      0 - 125 degrees of flexion. Tenderness:      (+) MJLT      (+) LJLT      (+) Medial patellar facet tenderness      (neg) Lateral patellar facet tenderness      (+) Crepitus      (neg) Patellar grind tenderness      (neg) Patellar tendon      (neg) Quad tendon      Other: Stability:      (neg) Lachman      (neg) Varus/Valgus instability      (neg) Posterior drawer      (neg) Patellar translation: wnl Additional tests:      (neg) McMurrays test      (neg) Patellar apprehension      Other: Strength: 5/5 Q/H/TA/GS/EHL Neuro: In tact to light touch throughout, DTR's wnl Vascularity: Extremity warm and well perfused Gait: mild antalgic

## 2024-09-13 NOTE — DISCUSSION/SUMMARY
[de-identified] :  Discussed TKA at length Rec f/u with joint replacement   ----------------------------------------------------------------------------   All relevant imaging studies pertinent to today's visit, including x-rays, MRI's and/or other advanced imaging studies (CT/etc) were independently interpreted and reviewed with the patient as needed. Implications of the studies together with the patient's clinical picture were discussed to formulate a working diagnosis and management options were detailed.   The patient and/or guardian was advised of the diagnosis.  The natural history of the pathology was explained in full. All questions were answered.  The risks and benefits of conservative and interventional treatment alternatives were explained to the patient  The patient and/or guardian was advised if any advanced diagnostic/imaging study (MRI/CT/etc) is ordered to evaluate potential pathology in the affected area(s), they should follow up in the office to review the results of the study and determine further management that may be indicated.

## 2024-10-04 ENCOUNTER — APPOINTMENT (OUTPATIENT)
Dept: ORTHOPEDIC SURGERY | Facility: CLINIC | Age: 54
End: 2024-10-04
Payer: MEDICAID

## 2024-10-04 DIAGNOSIS — M17.0 BILATERAL PRIMARY OSTEOARTHRITIS OF KNEE: ICD-10-CM

## 2024-10-04 PROCEDURE — 99213 OFFICE O/P EST LOW 20 MIN: CPT

## 2024-10-04 NOTE — HISTORY OF PRESENT ILLNESS
[de-identified] : Patient is a 54 year ol female here for her both her knees. States she has been having pain in the knees for the past 4-5 years. She has been seeing Dr. Lees and was told to see Dr. Donato to discuss knee replacement. RIght is worse than left knee. SHe complains of pain at night with sleep, stairs, and walking long  distance [] : no [FreeTextEntry1] : b/l kness.

## 2024-10-04 NOTE — ASSESSMENT
[FreeTextEntry1] : 54F p/w orly knee OA  She would like to try a course of gel injections prior to surgery, she is changing insurances and will f/u after   The natural progression of Osteoarthritis was explained to the patient. We discussed the possible treatment options from conservative to operative. These included NSAIDS, Glucosamine and Chondrotin sulfate, and Physical Therapy as well different types of injections. We also discussed that at some point they may progress to needed a TKA. Information and pamphlets were given.

## 2025-02-28 ENCOUNTER — APPOINTMENT (OUTPATIENT)
Dept: ORTHOPEDIC SURGERY | Facility: CLINIC | Age: 55
End: 2025-02-28
Payer: MEDICAID

## 2025-02-28 DIAGNOSIS — M17.0 BILATERAL PRIMARY OSTEOARTHRITIS OF KNEE: ICD-10-CM

## 2025-02-28 PROCEDURE — 99215 OFFICE O/P EST HI 40 MIN: CPT

## 2025-03-12 ENCOUNTER — APPOINTMENT (OUTPATIENT)
Dept: CT IMAGING | Facility: CLINIC | Age: 55
End: 2025-03-12
Payer: MEDICAID

## 2025-03-12 PROCEDURE — 73700 CT LOWER EXTREMITY W/O DYE: CPT | Mod: LT

## 2025-03-28 ENCOUNTER — APPOINTMENT (OUTPATIENT)
Dept: ORTHOPEDIC SURGERY | Facility: CLINIC | Age: 55
End: 2025-03-28